# Patient Record
Sex: MALE | Race: WHITE
[De-identification: names, ages, dates, MRNs, and addresses within clinical notes are randomized per-mention and may not be internally consistent; named-entity substitution may affect disease eponyms.]

---

## 2018-04-12 ENCOUNTER — HOSPITAL ENCOUNTER (INPATIENT)
Dept: HOSPITAL 75 - ER | Age: 33
LOS: 2 days | Discharge: HOME | DRG: 872 | End: 2018-04-14
Attending: FAMILY MEDICINE | Admitting: FAMILY MEDICINE
Payer: COMMERCIAL

## 2018-04-12 VITALS — DIASTOLIC BLOOD PRESSURE: 65 MMHG | SYSTOLIC BLOOD PRESSURE: 130 MMHG

## 2018-04-12 VITALS — HEIGHT: 74 IN | BODY MASS INDEX: 36.46 KG/M2 | WEIGHT: 284.13 LBS

## 2018-04-12 DIAGNOSIS — A40.1: Primary | ICD-10-CM

## 2018-04-12 DIAGNOSIS — N39.0: ICD-10-CM

## 2018-04-12 DIAGNOSIS — Z79.899: ICD-10-CM

## 2018-04-12 DIAGNOSIS — E11.9: ICD-10-CM

## 2018-04-12 DIAGNOSIS — I10: ICD-10-CM

## 2018-04-12 LAB
ALBUMIN SERPL-MCNC: 4.3 GM/DL (ref 3.2–4.5)
ALP SERPL-CCNC: 42 U/L (ref 40–136)
ALT SERPL-CCNC: 48 U/L (ref 0–55)
AMYLASE SERPL-CCNC: 51 U/L (ref 25–125)
APTT PPP: YELLOW S
BACTERIA #/AREA URNS HPF: (no result) /HPF
BARBITURATES UR QL: NEGATIVE
BASOPHILS # BLD AUTO: 0 10^3/UL (ref 0–0.1)
BASOPHILS NFR BLD AUTO: 0 % (ref 0–10)
BASOPHILS NFR BLD MANUAL: 0 %
BENZODIAZ UR QL SCN: NEGATIVE
BILIRUB SERPL-MCNC: 1.2 MG/DL (ref 0.1–1)
BILIRUB UR QL STRIP: NEGATIVE
BUN/CREAT SERPL: 16
CALCIUM SERPL-MCNC: 9.5 MG/DL (ref 8.5–10.1)
CHLORIDE SERPL-SCNC: 101 MMOL/L (ref 98–107)
CO2 SERPL-SCNC: 25 MMOL/L (ref 21–32)
COCAINE UR QL: NEGATIVE
CREAT SERPL-MCNC: 0.9 MG/DL (ref 0.6–1.3)
EOSINOPHIL # BLD AUTO: 0.1 10^3/UL (ref 0–0.3)
EOSINOPHIL NFR BLD AUTO: 1 % (ref 0–10)
EOSINOPHIL NFR BLD MANUAL: 3 %
ERYTHROCYTE [DISTWIDTH] IN BLOOD BY AUTOMATED COUNT: 13.3 % (ref 10–14.5)
FIBRINOGEN PPP-MCNC: (no result) MG/DL
GFR SERPLBLD BASED ON 1.73 SQ M-ARVRAT: > 60 ML/MIN
GLUCOSE SERPL-MCNC: 143 MG/DL (ref 70–105)
GLUCOSE UR STRIP-MCNC: NEGATIVE MG/DL
HCT VFR BLD CALC: 43 % (ref 40–54)
HGB BLD-MCNC: 14.7 G/DL (ref 13.3–17.7)
KETONES UR QL STRIP: NEGATIVE
LEUKOCYTE ESTERASE UR QL STRIP: (no result)
LIPASE SERPL-CCNC: 6 U/L (ref 8–78)
LYMPHOCYTES # BLD AUTO: 0.9 X 10^3 (ref 1–4)
LYMPHOCYTES NFR BLD AUTO: 8 % (ref 12–44)
MANUAL DIFFERENTIAL PERFORMED BLD QL: YES
MCH RBC QN AUTO: 28 PG (ref 25–34)
MCHC RBC AUTO-ENTMCNC: 34 G/DL (ref 32–36)
MCV RBC AUTO: 82 FL (ref 80–99)
METHADONE UR QL SCN: NEGATIVE
METHAMPHETAMINE SCREEN URINE S: NEGATIVE
MONOCYTES # BLD AUTO: 0.1 X 10^3 (ref 0–1)
MONOCYTES NFR BLD AUTO: 1 % (ref 0–12)
MONOCYTES NFR BLD: 0 %
NEUTROPHILS # BLD AUTO: 9.4 X 10^3 (ref 1.8–7.8)
NEUTROPHILS NFR BLD AUTO: 90 % (ref 42–75)
NEUTS BAND NFR BLD MANUAL: 76 %
NEUTS BAND NFR BLD: 8 %
NITRITE UR QL STRIP: NEGATIVE
OPIATES UR QL SCN: NEGATIVE
OXYCODONE UR QL: NEGATIVE
PH UR STRIP: 6 [PH] (ref 5–9)
PLATELET # BLD: 180 10^3/UL (ref 130–400)
PMV BLD AUTO: 11.1 FL (ref 7.4–10.4)
POTASSIUM SERPL-SCNC: 3.8 MMOL/L (ref 3.6–5)
PROPOXYPH UR QL: NEGATIVE
PROT SERPL-MCNC: 7.1 GM/DL (ref 6.4–8.2)
PROT UR QL STRIP: (no result)
RBC # BLD AUTO: 5.19 10^6/UL (ref 4.35–5.85)
RBC #/AREA URNS HPF: (no result) /HPF
RBC MORPH BLD: NORMAL
SODIUM SERPL-SCNC: 137 MMOL/L (ref 135–145)
SP GR UR STRIP: 1.01 (ref 1.02–1.02)
TRICYCLICS UR QL SCN: NEGATIVE
UROBILINOGEN UR-MCNC: NORMAL MG/DL
VARIANT LYMPHS NFR BLD MANUAL: 13 %
WBC # BLD AUTO: 10.5 10^3/UL (ref 4.3–11)
WBC #/AREA URNS HPF: (no result) /HPF

## 2018-04-12 PROCEDURE — 80320 DRUG SCREEN QUANTALCOHOLS: CPT

## 2018-04-12 PROCEDURE — 87040 BLOOD CULTURE FOR BACTERIA: CPT

## 2018-04-12 PROCEDURE — 83690 ASSAY OF LIPASE: CPT

## 2018-04-12 PROCEDURE — 83605 ASSAY OF LACTIC ACID: CPT

## 2018-04-12 PROCEDURE — 85027 COMPLETE CBC AUTOMATED: CPT

## 2018-04-12 PROCEDURE — 74022 RADEX COMPL AQT ABD SERIES: CPT

## 2018-04-12 PROCEDURE — 80306 DRUG TEST PRSMV INSTRMNT: CPT

## 2018-04-12 PROCEDURE — 86308 HETEROPHILE ANTIBODY SCREEN: CPT

## 2018-04-12 PROCEDURE — 82962 GLUCOSE BLOOD TEST: CPT

## 2018-04-12 PROCEDURE — 87088 URINE BACTERIA CULTURE: CPT

## 2018-04-12 PROCEDURE — 36415 COLL VENOUS BLD VENIPUNCTURE: CPT

## 2018-04-12 PROCEDURE — 82150 ASSAY OF AMYLASE: CPT

## 2018-04-12 PROCEDURE — 96365 THER/PROPH/DIAG IV INF INIT: CPT

## 2018-04-12 PROCEDURE — 81000 URINALYSIS NONAUTO W/SCOPE: CPT

## 2018-04-12 PROCEDURE — 96361 HYDRATE IV INFUSION ADD-ON: CPT

## 2018-04-12 PROCEDURE — 85007 BL SMEAR W/DIFF WBC COUNT: CPT

## 2018-04-12 PROCEDURE — 74176 CT ABD & PELVIS W/O CONTRAST: CPT

## 2018-04-12 PROCEDURE — 96375 TX/PRO/DX INJ NEW DRUG ADDON: CPT

## 2018-04-12 PROCEDURE — 85025 COMPLETE CBC W/AUTO DIFF WBC: CPT

## 2018-04-12 PROCEDURE — 80053 COMPREHEN METABOLIC PANEL: CPT

## 2018-04-12 RX ADMIN — SODIUM CHLORIDE SCH MLS/HR: 900 INJECTION, SOLUTION INTRAVENOUS at 23:34

## 2018-04-12 NOTE — XMS REPORT
[facility]

 Created on: 10/06/2015



Wilmer Smart

External Reference #: [id number]

: 1985

Sex: Male



Demographics







 Address  [address] [place]  [address]

 

 Home Phone  [phone]

 

 Preferred Language  Unknown

 

 Marital Status  Unknown

 

 Anabaptism Affiliation  Unknown

 

 Race  White

 

 Ethnic Group  Not  or 





Author







 Author  STU ABRAHAM

 

 Organization  [employer]

 

 Address  Unknown

 

 Phone  Unavailable







Care Team Providers







 Care Team Member Name  Role  Phone

 

 STU ABRAHAM  CP  Unavailable



                                                                



Allergies

          No Known Allergies                                                   
                                     



Problems

          





 Problem Type  Condition  Code  Onset Dates  Condition Status

 

 Problem  Diabetes mellitus without mention of complication, type II or 
unspecified type, uncontrolled  250.02     Active

 

 Problem  Diabetes mellitus without mention of complication, type II or 
unspecified type, not stated as uncontrolled  250.00     Active

 

 Problem  Essential hypertension, benign  401.1     Active

 

 Problem  Generalized anxiety disorder  300.02     Active



                                                                               
                                       



Medications

          No Known Medications                                                 
                             



Results

          No Known Results                                                     
               



Summary Purpose

          eClinicalWorks Submission

## 2018-04-12 NOTE — XMS REPORT
Continuity of Care Document

 Created on: 2018



GERBER VIERA

External Reference #: [id number]

: 1985

Sex: Male



Demographics







 Address  [address] [place]  [address]

 

 Home Phone  [phone] x

 

 Preferred Language  Unknown

 

 Marital Status  Unknown

 

 Latter-day Affiliation  Unknown

 

 Race  Unknown

 

 Ethnic Group  Unknown





Author







 Author  [facility]

 

 Address  Unknown

 

 Phone  Unavailable



              



Allergies

      





 Active            Description            Code            Type            
Severity            Reaction            Onset            Reported/Identified   
         Relationship to Patient            Clinical Status        

 

 Yes            No Allergy Information Available            [id number]         
   Drug Allergy            Unknown            N/A                         10/14/
2015                                  



                  



Medications

      



There is no data.                  



Problems

      





 Date Dx Coded            Attending            Type            Code            
Diagnosis            Diagnosed By        

 

 2014            STU WELCH                         250.02   
         DIABETES II UNCONTROLLED (UNCOMPLICATED)                     

 

 2014            STU WELCH                         380.4    
        CERUMEN IMPACTION                     

 

 2014            STU WELCH                         401.1    
        HYPERTENSION, BENIGN ESSENTIAL                     

 

 2014            STU WELCH                         788.41   
         URINARY FREQUENCY                     

 

 2014            STU WELCH                         250.02   
         DIABETES II UNCONTROLLED (UNCOMPLICATED)                     

 

 2014            STU WELCH                         380.4    
        CERUMEN IMPACTION                     

 

 2014            STU WELCH                         401.1    
        HYPERTENSION, BENIGN ESSENTIAL                     

 

 2014            STU WELCH                         788.41   
         URINARY FREQUENCY                     

 

 2014            STU WELCH                         250.02   
         DIABETES II UNCONTROLLED (UNCOMPLICATED)                     

 

 2014            STU WELCH                         380.4    
        CERUMEN IMPACTION                     

 

 2014            STU WELCH                         401.1    
        HYPERTENSION, BENIGN ESSENTIAL                     

 

 2014            STU WELCH                         788.41   
         URINARY FREQUENCY                     

 

 2014            TESFAYE DO PASCUAL K                         250.02         
   DIABETES II UNCONTROLLED (UNCOMPLICATED)                     

 

 2014            TESFAYE DO PASCUAL K                         380.4          
  CERUMEN IMPACTION                     

 

 2014            TESFAYE DO PASCUAL K                         401.1          
  HYPERTENSION, BENIGN ESSENTIAL                     

 

 2014            TESFAYE DO, PASCUAL K                         788.41         
   URINARY FREQUENCY                     

 

 2014            STU WELCH                         V04.81   
         FLU SHOT                     

 

 2014            ABRAHAM APRN, STU R                         V04.81   
         FLU SHOT                     

 

 2014            JARAD LINDSEYSTU POLANCO                         V04.81   
         FLU SHOT                     

 

 2014            PASCUAL TESFAYE DO                         V04.81         
   FLU SHOT                     

 

 2014            JARAD LINDSEYSTU POLANCO                         300.02   
         AN GEN ANXIETY                     

 

 2014            JARAD LINDSEYSTU POLANCO                         300.02   
         AN GEN ANXIETY                     

 

 2014            PASCUAL TESFAYE DO                         300.02         
   AN GEN ANXIETY                     

 

 2015            JARAD LINDSEYSTU POLANCO                         250.00   
         DIABETES II CONTROLLED (UNCOMPLICATED)                     

 

 2015            MERA  PASCUAL GOMEZ                         250.00         
   DIABETES II CONTROLLED (UNCOMPLICATED)                     

 

 10/28/2015            TAWIL MD, TETO EUBANKS            Ot            N35.9        
                          

 

 2016                         Ot            R10.11                       
           

 

 2016                         Ot            R10.11                       
           

 

 2016                         Ot            R10.11            RIGHT UPPER 
QUADRANT PAIN                     

 

 2018            TAWIL MD, ELIAS A            Ot            N35.9        
    URETHRAL STRICTURE, UNSPECIFIED                     

 

 2018                         Ot            R10.11            RIGHT UPPER 
QUADRANT PAIN                     

 

 2018            TAWIL MD, ELIAS A            Ot            N35.9        
    URETHRAL STRICTURE, UNSPECIFIED                     

 

 2018                         Ot            R10.11            RIGHT UPPER 
QUADRANT PAIN                     

 

 2018            TAWIL MD, ELIAS A            Ot            N35.9        
    URETHRAL STRICTURE, UNSPECIFIED                     

 

 2018                         Ot            R10.11            RIGHT UPPER 
QUADRANT PAIN                     



                                                                               
       



Procedures

      





 Code            Description            Performed By            Performed On   
     

 

             06502                                  UA LONG DIP                
                   2014        

 

             63331                                  GLUCOSE FINGER STICK       
                            2014        

 

             80678                                  A1C (IN-HOUSE)             
                      2014        

 

             80140                                  MICRO ALBUMIN-IN HOUSE     
                              2014        

 

             49335                                  ROUTINE VENIPUNCTURE       
                            2014        

 

             16668                                  CBC                        
           2014        

 

             8965389                                  GFR CALC (RESULT ONLY)   
                                2014        

 

             70820                                  CMP                        
           2014        

 

             08972                                  LIPID PANEL                
                   2014        

 

             90829                                  PSA TOTAL                  
                 2014        

 

             65293                                  TSH                        
           2014        

 

             14832                                  A1C (IN-HOUSE)             
                      2015        



                                        



Results

      





 Test            Result            Range        









 HEPATITIS PROFILE - 10/23/17 08:22         









 Hep A Ab, IgM            Negative             Negative        

 

 HBsAg Screen            Negative             Negative        

 

 Hep B Core Ab, IgM            Negative             Negative        

 

 Hep C Virus Ab            <0.1 s/co ratio            0.0-0.9        









 CBC With Differential/Platelet - 10/23/17 08:22         









 WBC            4.8 x10E3/uL            3.4-10.8        

 

 RBC            6.04 x10E6/uL            4.14-5.80        

 

 Hemoglobin            16.3 g/dL            12.6-17.7        

 

 Hematocrit            49.3 %            37.5-51.0        

 

 MCV            82 fL            79-97        

 

 MCH            27.0 pg            26.6-33.0        

 

 MCHC            33.1 g/dL            31.5-35.7        

 

 RDW            13.0 %            12.3-15.4        

 

 Platelets            225 x10E3/uL            150-379        

 

 Neutrophils            51 %            Not Estab.        

 

 Lymphs            33 %            Not Estab.        

 

 Monocytes            13 %            Not Estab.        

 

 Eos            2 %            Not Estab.        

 

 Basos            1 %            Not Estab.        

 

 Neutrophils (Absolute)            2.4 x10E3/uL            1.4-7.0        

 

 Lymphs (Absolute)            1.6 x10E3/uL            0.7-3.1        

 

 Monocytes(Absolute)            0.6 x10E3/uL            0.1-0.9        

 

 Eos (Absolute)            0.1 x10E3/uL            0.0-0.4        

 

 Baso (Absolute)            0.1 x10E3/uL            0.0-0.2        

 

 Immature Granulocytes            0 %            Not Estab.        

 

 Immature Grans (Abs)            0.0 x10E3/uL            0.0-0.1        









 Comp. Metabolic Panel (14) - 10/23/17 08:22         









 Glucose, Serum            292 mg/dL            65-99        

 

 BUN            15 mg/dL            6-20        

 

 Creatinine, Serum            0.78 mg/dL            0.76-1.27        

 

 eGFR If NonAfricn Am            119 mL/min/1.73                >59        

 

 eGFR If Africn Am            137 mL/min/1.73                >59        

 

 BUN/Creatinine Ratio            19             9-20        

 

 Sodium, Serum            138 mmol/L            134-144        

 

 Potassium, Serum            4.7 mmol/L            3.5-5.2        

 

 Chloride, Serum            96 mmol/L                    

 

 Carbon Dioxide, Total            25 mmol/L            18-29        

 

 Calcium, Serum            9.7 mg/dL            8.7-10.2        

 

 Protein, Total, Serum            7.6 g/dL            6.0-8.5        

 

 Albumin, Serum            4.7 g/dL            3.5-5.5        

 

 Globulin, Total            2.9 g/dL            1.5-4.5        

 

 A/G Ratio            1.6             1.2-2.2        

 

 Bilirubin, Total            1.2 mg/dL            0.0-1.2        

 

 Alkaline Phosphatase, S            53 IU/L                    

 

 AST (SGOT)            27 IU/L            0-40        

 

 ALT (SGPT)            78 IU/L            0-44        









 Lipid Panel - 10/23/17 08:22         









 Cholesterol, Total            196 mg/dL            100-199        

 

 Triglycerides            166 mg/dL            0-149        

 

 HDL Cholesterol            37 mg/dL            >39        

 

 VLDL Cholesterol Graham            33 mg/dL            5-40        

 

 LDL Cholesterol Calc            126 mg/dL            0-99        









 Hepatitis Panel (4) - 10/23/17 08:22         









 HBsAg Screen            Negative             Negative        

 

 Hep A Ab, IgM            Negative             Negative        

 

 Hep B Core Ab, IgM            Negative             Negative        

 

 Hep C Virus Ab            <0.1 s/co ratio            0.0-0.9        









 Thyroid Hunters Profile - 10/23/17 08:22         









 TSH            1.590 uIU/mL            0.450-4.500        



                          



Encounters

      





 ACCT No.            Visit Date/Time            Discharge            Status    
        Pt. Type            Provider            Facility            Loc./Unit  
          Complaint        

 

 [id number]            2015 13:50:00            2015 23:59:59          
  CLS            Outpatient            PASCUAL TESFAYE DO                        
                       

 

 [id number]            2015 13:23:00            2015 23:59:59          
  CLS            Outpatient            STU WELCH                  
                             

 

 [id number]            2014 13:09:00            2014 23:59:59          
  CLS            Outpatient            STU WELCH                  
                             

 

 [id number]            2014 10:12:00            2014 23:59:59          
  CLS            Outpatient            STU WELCH                  
                             

 

 [id number]            2018 19:00:00                         ACT            
Outpatient            [facility]ATHIS                         [facility]                     

 

 [id number]            10/23/2017 08:20:00                                      
Document Registration                                                          
  

 

 [id number]            10/14/2015 12:02:00            10/14/2015 23:59:59    
        CLS            Outpatient            TAWIL MD, ELIAS A            [facility]            RAD            STRICTURE        

 

 [id number]            2018 22:43:00                         ACT       
     Inpatient            CJ GLASGOW MD            [facility]            4TH            SEPSIS;UTI;NIDDM        

 

 [id number]            2016 10:04:00                                   
   Document Registration                                                       
     

 

 [id number]            10/24/2017 12:08:00                                   
   Document Registration

## 2018-04-12 NOTE — DIAGNOSTIC IMAGING REPORT
PROCEDURE: CT urinary tract, rule out kidney stone.



TECHNIQUE: Multiple contiguous axial images were obtained through

the abdomen and pelvis without the use of intravenous contrast.



INDICATION: Back pain, dysuria.



COMPARISON: There are no prior studies available for comparison.



FINDINGS:  

There is no evidence for nephrolithiasis or urolithiasis and the

kidneys do not seem to be obstructed.



The urinary bladder is not well-distended and there does seem to

be generalized thickening of the wall of the bladder. This is

probably related to the incomplete distention of the bladder.

However, the possibility that there is an element of cystitis

present should still be considered. Correlation with the

patient's urinalysis would be recommended. 



The prostate gland is not enlarged. 



There is no pelvic mass or free fluid collection noted.



The appendix was visualized and is not abnormally thickened.



Both the liver and spleen are mildly enlarged. The liver measures

approximately 18.5 cm in length while the spleen is estimated at

13.6 cm. The reason for the hepatosplenomegaly is not certain.



The stomach is partially filled with fluid and gas and difficult

to assess.



The gallbladder, adrenals, pancreas, aorta and inferior vena cava

show no sign of an acute abnormality. 



The lung bases are clear.



The bone windows show no sign of a fracture or of a destructive

lesion. 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

however.



IMPRESSION:

1. There is no acute abnormality of the abdomen or pelvis.

2. The thickened appearance of the wall of the bladder may be

secondary to incomplete distention. The possibility that there is

an element of cystitis present should also be considered.

Correlation with patient's urinalysis would be recommended. 

3. There is mild hepatosplenomegaly. This is of uncertain

etiology.









Dictated by: 



  Dictated on workstation # [id number]

## 2018-04-12 NOTE — XMS REPORT
[facility]

 Created on: 2015



Wilmer Smart

External Reference #: [id number]

: 1985

Sex: Male



Demographics







 Address  [address] [place]  [address]

 

 Home Phone  [phone]

 

 Preferred Language  Unknown

 

 Marital Status  Unknown

 

 Synagogue Affiliation  Unknown

 

 Race  White

 

 Ethnic Group  Not  or 





Author







 Author  STU ABRAHAM

 

 Organization  [employer]

 

 Address  Unknown

 

 Phone  Unavailable







Care Team Providers







 Care Team Member Name  Role  Phone

 

 STU ABRAHAM  CP  Unavailable



                                                                



Allergies

          No Known Allergies                                                   
                                     



Problems

          





 Problem Type  Condition  ICD-9 Code  Onset Dates  Condition Status

 

 Problem  Diabetes mellitus without mention of complication, type II or 
unspecified type, uncontrolled  250.02     Active

 

 Problem  Diabetes mellitus without mention of complication, type II or 
unspecified type, not stated as uncontrolled  250.00     Active

 

 Problem  Essential hypertension, benign  401.1     Active

 

 Problem  Generalized anxiety disorder  300.02     Active



                                                                               
                                       



Medications

          





 Medication  Code System  Code  Instructions  Start Date  End Date  Status  
Dosage

 

 Celexa  NDC  [address]-99  20 MG Orally Once a day  2015        1 
tablet

 

 Terazosin HCl  NDC  [address]-85  2 MG Orally Once a day  Aug 27, 2015        
1 capsule



                                                                               
         



Results

          No Known Results                                                     
               



Summary Purpose

          eClinicalWorks Submission

## 2018-04-12 NOTE — DIAGNOSTIC IMAGING REPORT
EXAMINATION: Acute abdomen



INDICATION: Back pain and dysuria



The accompanying erect PA chest shows the heart size to be within

normal limits. The perihilar markings are prominent bilaterally,

particularly on the right. There is no evidence for a hilar mass

on the right but a followup PA and lateral chest would be

recommended for further study unless there are previous exams

available to demonstrate that this finding is stable.



The lungs are clear. There is no sign of pneumoperitoneum.



Supine and erect views of the abdomen were obtained. There is gas

in both the large and small bowel in a nonspecific fashion. There

is no evidence for bowel obstruction. Both the liver and spleen

shadows do seem prominent. The CT abdomen/pelvis exam performed

prior to the study did suggest mild hepatosplenomegaly. The

osseous structures are intact.



IMPRESSION:

1. The bowel gas pattern is nonspecific. There is no acute

abnormality identified.

2. There is mild hepatosplenomegaly.

3. The right hilum is prominent. This finding is more likely due

to superimposition of the pulmonary vessels than to a mass

lesion. Recommendations as above.



Dictated by: 



  Dictated on workstation # [id number]

## 2018-04-12 NOTE — XMS REPORT
[facility]

 Created on: 2017



Bing Wilmer

External Reference #: [id number]

: 1985

Sex: Male



Demographics







 Address  [address] [place]  [address]

 

 Preferred Language  Unknown

 

 Marital Status  Unknown

 

 Latter day Affiliation  Unknown

 

 Race  Unknown

 

 Ethnic Group  Unknown





Author







 Author  STU ABRAHAM

 

 [facility]

 

 Address  [address] [place]  [address]



 

 Phone  [phone]







Care Team Providers







 Care Team Member Name  Role  Phone

 

 STU ABRAHAM  Unavailable  [phone]







PROBLEMS







 Type  Condition  ICD9-CM Code  DDY20-PW Code  Onset Dates  Condition Status  
SNOMED Code

 

 Problem  Essential hypertension, benign  401.1        Active  0210413

 

 Problem  Diabetes mellitus without mention of complication, type II or 
unspecified type, uncontrolled  250.02        Active  860566219

 

 Problem  Diabetes mellitus without mention of complication, type II or 
unspecified type, not stated as uncontrolled  250.00        Active  830865869

 

 Problem  Generalized anxiety disorder  300.02        Active  91963990







ALLERGIES

Unknown Allergies



SOCIAL HISTORY

No smoking Hx information available



PLAN OF CARE





VITAL SIGNS





MEDICATIONS

Unknown Medications



RESULTS

No Results



PROCEDURES

No Known procedures



IMMUNIZATIONS

No Known Immunizations

## 2018-04-12 NOTE — XMS REPORT
[facility]

 Created on: 2015



Lonoke Wilmer

External Reference #: [id number]

: 1985

Sex: Male



Demographics







 Address  [address] [place]  [address]

 

 Home Phone  [phone]

 

 Preferred Language  Unknown

 

 Marital Status  Unknown

 

 Yarsanism Affiliation  Unknown

 

 Race  White

 

 Ethnic Group  Not  or 





Author







 Author  STU ABRAHAM

 

 Organization  [employer]

 

 Address  Unknown

 

 Phone  Unavailable







Care Team Providers







 Care Team Member Name  Role  Phone

 

 STU ABRAHAM  CP  Unavailable



                                                                



Allergies

          No Known Allergies                                                   
                                     



Problems

          





 Problem Type  Condition  ICD-9 Code  Onset Dates  Condition Status

 

 Problem  Diabetes mellitus without mention of complication, type II or 
unspecified type, uncontrolled  250.02     Active

 

 Problem  Diabetes mellitus without mention of complication, type II or 
unspecified type, not stated as uncontrolled  250.00     Active

 

 Problem  Essential hypertension, benign  401.1     Active

 

 Problem  Generalized anxiety disorder  300.02     Active

 

 Assessment  Essential hypertension, benign  401.1     Active



                                                                               
                                                 



Medications

          





 Medication  Code System  Code  Instructions  Start Date  End Date  Status  
Dosage

 

 Lisinopril  NDC  [address]-49  10 MG Orally Once a day  2015        
take 1 tablet



                                                                              



Results

          No Known Results                                                     
               



Summary Purpose

          eClinicalWorks Submission

## 2018-04-12 NOTE — XMS REPORT
[facility]

 Created on: 2016



Mad[place] Wilmer

External Reference #: [id number]

: 1985

Sex: Male



Demographics







 Address  [address] [place]  [address]

 

 Home Phone  [phone]

 

 Preferred Language  Unknown

 

 Marital Status  Unknown

 

 Temple Affiliation  Unknown

 

 Race  White

 

 Ethnic Group  Not  or 





Author







 Author  STU ABRAHAM

 

 Organization  [employer]

 

 Address  Unknown

 

 Phone  Unavailable







Care Team Providers







 Care Team Member Name  Role  Phone

 

 STU ABRAHAM  CP  Unavailable



                                                                



Allergies

          No Known Allergies                                                   
                                     



Problems

          





 Problem Type  Condition  Code  Onset Dates  Condition Status

 

 Problem  Diabetes mellitus without mention of complication, type II or 
unspecified type, uncontrolled  250.02     Active

 

 Problem  Diabetes mellitus without mention of complication, type II or 
unspecified type, not stated as uncontrolled  250.00     Active

 

 Problem  Essential hypertension, benign  401.1     Active

 

 Problem  Generalized anxiety disorder  300.02     Active



                                                                               
                                       



Medications

          





 Medication  Code System  Code  Instructions  Start Date  End Date  Status  
Dosage

 

 Celexa  NDC  [address]-69  20 MG Orally Once a day  2015        1 
tablet

 

 Simvastatin  NDC  [address]-10  20 MG Orally Once a day  2014        
1 tablet in the evening

 

 Terazosin HCl  NDC  [address]-33  2 MG Orally Once a day  Aug 27, 2015        
1 capsule



                                                                               
                   



Results

          No Known Results                                                     
               



Summary Purpose

          [employer] Submission

## 2018-04-12 NOTE — XMS REPORT
[facility]

 Created on: 2015



[place] Wilmer

External Reference #: [id number]

: 1985

Sex: Male



Demographics







 Address  [address] [place]  [address]

 

 Home Phone  [phone]

 

 Preferred Language  Unknown

 

 Marital Status  Unknown

 

 [employer] Affiliation  Unknown

 

 Race  White

 

 Ethnic Group  Not  or 





Author







 Author  STU ABRAHAM

 

 Organization  [employer]

 

 Address  Unknown

 

 Phone  Unavailable







Care Team Providers







 Care Team Member Name  Role  Phone

 

 STU ABRAHAM  CP  Unavailable



                                                                



Allergies

          No Known Allergies                                                   
                                     



Problems

          





 Problem Type  Condition  Code  Onset Dates  Condition Status

 

 Problem  Diabetes mellitus without mention of complication, type II or 
unspecified type, uncontrolled  250.02     Active

 

 Problem  Diabetes mellitus without mention of complication, type II or 
unspecified type, not stated as uncontrolled  250.00     Active

 

 Problem  Essential hypertension, benign  401.1     Active

 

 Problem  Generalized anxiety disorder  300.02     Active



                                                                               
                                       



Medications

          





 Medication  Code System  Code  Instructions  Start Date  End Date  Status  
Dosage

 

 Lisinopril  NDC  [address]-71  10 MG Orally Once a day  2015        
take 1 tablet



                                                                              



Results

          No Known Results                                                     
               



Summary Purpose

          [employer] Submission

## 2018-04-12 NOTE — ED ABDOMINAL PAIN
General


Chief Complaint:  Abdominal/GI Problems


Stated Complaint:  LUQ PAIN


Source of Information:  Patient, EMS


Exam Limitations:  Other (SOMEWHAT DIFFICULT HISTORIAN)





History of Present Illness


Date Seen by Provider:  Apr 12, 2018


Time Seen by Provider:  20:23


Initial Comments


PT ARRIVES VIA EMS FROM [facility]


PT HAS HAD ONGOING PROBLEMS WITH URINATION--URGENCY, FREQUENCY, SMALL AMOUNTS, 

PAIN ON URINATION, AND BLADDER SPASMS--ONGOING OFF AND ON FOR YEARS, WORSE THE 

LAST MONTH OR SO


C/O LUQ AND LEFT FLANK PAIN X 1 MONTH, WORSE TODAY 


WOKE UP AT 1700 TONIGHT WITH FEVER, AND NAUSEA/DRY HEAVES--STATES HE "FELT FINE

" EARLIER IN THE DAY


TEMP .3 AT [facility], SO EMS WAS CALLED. PO ZOFRAN WAS GIVEN AT [facility]


ALSO C/O HEADACHE


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 

IT,BUT PT STATES "IT DIDN'T WORK" 


PT HAS NOT TAKEN ANYTHING FOR HIS SYMPTOMS


PT HAS NOT SOUGHT CARE UNTIL TODAY FOR THESE PROBLEMS


PCP: [facility]





Allergies and Home Medications


Allergies


Coded Allergies:  


     No Known Drug Allergies (Unverified , 4/12/18)





Home Medications


Lisinopril 10 Mg Tablet, 10 MG PO DAILY, (Reported)





Patient Home Medication List


Home Medication List Reviewed:  Yes





Review of Systems


Constitutional:  see HPI, fever


EENTM:  No Symptoms Reported


Respiratory:  No Symptoms Reported


Cardiovascular:  No Symptoms Reported


Gastrointestinal:  See HPI, Abdominal Pain, Nausea, Vomiting


Genitourinary:  See HPI, Burning, Frequency, Flank Pain, Pain, Urgency


Musculoskeletal:  see HPI, back pain


Skin:  no symptoms reported


Psychiatric/Neurological:  Headache


Endocrine:  No Symptoms Reported


Hematologic/Lymphatic:  No Symptoms Reported





Past Medical-Social-Family Hx


Patient Social History


Alcohol Use:  Regular Use ("COUPLE OF BEERS A DAY" )


Recreational Drug Use:  Yes (THC)


Drug of Choice:  THC


Smoking Status:  Former Smoker (SMOKED WHEN YOUNGER)


Type Used:  Cigarettes


Recent Foreign Travel:  No


Contact w/Someone Who Travel:  No





Past Medical History


Surgeries:  Yes (URINARY TRACT STRICTURE, LEFT ANKLE FX/ORIF)


Orthopedic


Respiratory:  No


Cardiac:  Yes


High Cholesterol, Hypertension


Neurological:  No


Genitourinary:  Yes (URINARY TRACT STRICTURE)


UTI-Chronic


Gastrointestinal:  No


Musculoskeletal:  No


Endocrine:  Yes


Diabetes, Non-Insulin dep


HEENT:  No


Cancer:  No


Psychosocial:  Yes


Anxiety


Integumentary:  No


Blood Disorders:  No





Physical Exam


Vital Signs





Vital Signs - First Documented








 4/12/18





 20:29


 


Temp 103.2


 


Pulse 125


 


Resp 24


 


B/P (MAP) 138/71 (93)


 


Pulse Ox 96





Capillary Refill :


General Appearance:  obese, other (SOMEWHAT DRAMATIC)


HEENT:  PERRL/EOMI


Neck:  normal inspection


Respiratory:  normal breath sounds, no respiratory distress


Cardiovascular:  normal peripheral pulses, no edema, no JVD, no murmur, 

tachycardia


Gastrointestinal:  normal bowel sounds, soft, no organomegaly, no pulsatile mass

; No distended, No guarding, No rebound; tenderness (MILD DIFFUSE TENDERNESS, 

MORE ON LEFT SIDE AND SUPRAPUBIC AREA); No hernia, No mass





Focused Exam


Lactate Level


4/12/18 20:34: Lactic Acid Level 2.44*H





Lactic Acid Level





Laboratory Tests








Test


 4/12/18


20:34


 


Lactic Acid Level


 2.44 MMOL/L


(0.50-2.00)  *H











Progress/Results/Core Measures


Lab Results





Laboratory Tests








Test


 4/12/18


20:28 4/12/18


20:34 4/12/18


20:45 Range/Units


 


 


White Blood Count


 10.5 


 


 


 4.3-11.0


10^3/uL


 


Red Blood Count


 5.19 


 


 


 4.35-5.85


10^6/uL


 


Hemoglobin 14.7    13.3-17.7  G/DL


 


Hematocrit 43    40-54  %


 


Mean Corpuscular Volume 82    80-99  FL


 


Mean Corpuscular Hemoglobin 28    25-34  PG


 


Mean Corpuscular Hemoglobin


Concent 34 


 


 


 32-36  G/DL





 


Red Cell Distribution Width 13.3    10.0-14.5  %


 


Platelet Count


 180 


 


 


 130-400


10^3/uL


 


Mean Platelet Volume 11.1 H   7.4-10.4  FL


 


Neutrophils (%) (Auto) 90 H   42-75  %


 


Lymphocytes (%) (Auto) 8 L   12-44  %


 


Monocytes (%) (Auto) 1    0-12  %


 


Eosinophils (%) (Auto) 1    0-10  %


 


Basophils (%) (Auto) 0    0-10  %


 


Neutrophils # (Auto) 9.4 H   1.8-7.8  X 10^3


 


Lymphocytes # (Auto) 0.9 L   1.0-4.0  X 10^3


 


Monocytes # (Auto) 0.1    0.0-1.0  X 10^3


 


Eosinophils # (Auto)


 0.1 


 


 


 0.0-0.3


10^3/uL


 


Basophils # (Auto)


 0.0 


 


 


 0.0-0.1


10^3/uL


 


Neutrophils % (Manual) 76     %


 


Lymphocytes % (Manual) 13     %


 


Monocytes % (Manual) 0     %


 


Eosinophils % (Manual) 3     %


 


Basophils % (Manual) 0     %


 


Band Neutrophils 8     %


 


Blood Morphology Comment NORMAL     


 


Urine Color YELLOW     


 


Urine Clarity


 SLIGHTLY


CLOUDY 


 


  





 


Urine pH 6    5-9  


 


Urine Specific Gravity 1.010 L   1.016-1.022  


 


Urine Protein 2+ H   NEGATIVE  


 


Urine Glucose (UA) NEGATIVE    NEGATIVE  


 


Urine Ketones NEGATIVE    NEGATIVE  


 


Urine Nitrite NEGATIVE    NEGATIVE  


 


Urine Bilirubin NEGATIVE    NEGATIVE  


 


Urine Urobilinogen NORMAL    NORMAL  MG/DL


 


Urine Leukocyte Esterase 3+ H   NEGATIVE  


 


Urine RBC (Auto) 2+ H   NEGATIVE  


 


Urine RBC 0-2     /HPF


 


Urine WBC TNTC H    /HPF


 


Urine Crystals NONE     /LPF


 


Urine Bacteria LARGE H    /HPF


 


Urine Casts NONE     /LPF


 


Urine Mucus NEGATIVE     /LPF


 


Urine Culture Indicated YES     


 


Sodium Level 137    135-145  MMOL/L


 


Potassium Level 3.8    3.6-5.0  MMOL/L


 


Chloride Level 101      MMOL/L


 


Carbon Dioxide Level 25    21-32  MMOL/L


 


Anion Gap 11    5-14  MMOL/L


 


Blood Urea Nitrogen 14    7-18  MG/DL


 


Creatinine


 0.90 


 


 


 0.60-1.30


MG/DL


 


Estimat Glomerular Filtration


Rate > 60 


 


 


  





 


BUN/Creatinine Ratio 16     


 


Glucose Level 143 H     MG/DL


 


Calcium Level 9.5    8.5-10.1  MG/DL


 


Total Bilirubin 1.2 H   0.1-1.0  MG/DL


 


Aspartate Amino Transf


(AST/SGOT) 18 


 


 


 5-34  U/L





 


Alanine Aminotransferase


(ALT/SGPT) 48 


 


 


 0-55  U/L





 


Alkaline Phosphatase 42      U/L


 


Total Protein 7.1    6.4-8.2  GM/DL


 


Albumin 4.3    3.2-4.5  GM/DL


 


Amylase Level 51      U/L


 


Lipase 6 L   8-78  U/L


 


Urine Opiates Screen NEGATIVE    NEGATIVE  


 


Urine Oxycodone Screen NEGATIVE    NEGATIVE  


 


Urine Methadone Screen NEGATIVE    NEGATIVE  


 


Urine Propoxyphene Screen NEGATIVE    NEGATIVE  


 


Urine Barbiturates Screen NEGATIVE    NEGATIVE  


 


Ur Tricyclic Antidepressants


Screen NEGATIVE 


 


 


 NEGATIVE  





 


Urine Phencyclidine Screen NEGATIVE    NEGATIVE  


 


Urine Amphetamines Screen NEGATIVE    NEGATIVE  


 


Urine Methamphetamines Screen NEGATIVE    NEGATIVE  


 


Urine Benzodiazepines Screen NEGATIVE    NEGATIVE  


 


Urine Cocaine Screen NEGATIVE    NEGATIVE  


 


Urine Cannabinoids Screen POSITIVE H   NEGATIVE  


 


Serum Alcohol < 10    <10  MG/DL


 


Lactic Acid Level


 


 2.44 *H


 


 0.50-2.00


MMOL/L


 


Monoscreen   NEGATIVE  NEGATIVE  








My Orders





Orders - BRE SEALS DO


Saline Lock/Iv-Start (4/12/18 20:27)


Ct Abd/Pelvis Wo(Kidney Stone) (4/12/18 20:27)


Amylase (4/12/18 20:27)


Cbc With Automated Diff (4/12/18 20:27)


Comprehensive Metabolic Panel (4/12/18 20:27)


Drug Screen Stat (Urine) (4/12/18 20:27)


Lactic Acid Analyzer (4/12/18 20:27)


Lipase (4/12/18 20:27)


Ua Culture If Indicated (4/12/18 20:27)


Blood Culture (4/12/18 20:27)


Acute Abd Series (4/12/18 20:27)


Saline Lock/Iv-Start (4/12/18 20:27)


Lactated Ringers (Lr 1000 Ml Iv Solution (4/12/18 20:27)


Ondansetron Injection (Zofran Injectio (4/12/18 20:30)


Hyoscyamine Sl Tablet (Levsin Sl Tablet) (4/12/18 20:30)


Ketorolac Injection (Toradol Injection) (4/12/18 20:27)


Alfuzosin (Not Stocked) (Uroxatral (Not (4/12/18 20:30)


Alcohol (4/12/18 20:37)


Ns Iv 1000 Ml (Sodium Chloride 0.9%) (4/12/18 20:36)


Acetaminophen  Tablet (Tylenol  Tablet) (4/12/18 20:45)


Ibuprofen  Tablet (Motrin  Tablet) (4/12/18 20:45)


Manual Differential (4/12/18 20:28)


Urine Culture (4/12/18 20:28)


Ceftriaxone Injection (Rocephin Injectio (4/12/18 21:15)


Ibuprofen  Tablet (Motrin  Tablet) (4/12/18 21:09)


Ceftriaxone Injection (Rocephin Injectio (4/12/18 21:09)


Ns (Ivpb) (Sodium Chloride 0.9% Ivpb Bag (4/12/18 21:10)


Monotest (4/12/18 21:58)





Medications Given in ED





Current Medications








 Medications  Dose


 Ordered  Sig/Rena


 Route  Start Time


 Stop Time Status Last Admin


Dose Admin


 


 Acetaminophen  1,000 mg  ONCE  ONCE


 PO  4/12/18 20:45


 4/12/18 21:11 DC 4/12/18 21:15


1,000 MG


 


 Ceftriaxone


 Sodium 1000 mg/


 Sodium Chloride  100 ml @ 


 200 mls/hr  ONCE  ONCE


 IV  4/12/18 21:15


 4/12/18 21:44 DC 4/12/18 21:14


200 MLS/HR


 


 Hyoscyamine


 Sulfate  0.25 mg  ONCE  ONCE


 SL  4/12/18 20:30


 4/12/18 20:31 DC 4/12/18 20:41


0.25 MG


 


 Ibuprofen  800 mg  ONCE  ONCE


 PO  4/12/18 20:45


 4/12/18 21:11 DC 4/12/18 21:15


800 MG


 


 Ondansetron HCl  4 mg  ONCE  ONCE


 IVP  4/12/18 20:30


 4/12/18 20:31 DC 4/12/18 20:41


4 MG


 


 Sodium Chloride  1,000 ml @ 


 ud  STK-MED ONCE


 .ROUTE  4/12/18 20:36


 4/12/18 20:42 DC 4/12/18 20:42


0 MLS/HR








Vital Signs/I&O











 4/12/18





 20:29


 


Temp 103.2


 


Pulse 125


 


Resp 24


 


B/P (MAP) 138/71 (93)


 


Pulse Ox 96














 4/13/18





 00:00


 


Intake Total 1100 ml


 


Balance 1100 ml








Progress Note :  


Progress Note


SYMPTOMS IMPROVED AT TIME OF ADMIT





Comments


CT ABDOMEN/PELVIS--NO ACUTE PROCESS, UNDERDISTENTION OF BLADDER VS MILD CYSTITIS

, HEPATOSPLENOMEGALY, PER RADIOLOGIST REPORT @2159


   Reviewed:  Reviewed by Me





Departure


Communication (Admissions)


2200--SPOKE WITH DR. GLASGOW. ACCEPTS PT FOR ADMIT.





Impression





 Primary Impression:  


 Sepsis


 Additional Impressions:  


 UTI (urinary tract infection)


 NIDDM


 History of hypertension


 HISTORY OF HTN


Disposition:  09 ADMITTED AS INPATIENT


Condition:  Improved





Admissions


Decision to Admit Reason:  Admit from ER (General)


Decision to Admit/Date:  Apr 12, 2018


Time/Decision to Admit Time:  22:00





Departure-Patient Inst.


Referrals:  


[facility] (PCP/Family)


Primary Care Physician











BRE SEALS DO Apr 12, 2018 20:43

## 2018-04-13 VITALS — SYSTOLIC BLOOD PRESSURE: 143 MMHG | DIASTOLIC BLOOD PRESSURE: 57 MMHG

## 2018-04-13 VITALS — DIASTOLIC BLOOD PRESSURE: 58 MMHG | SYSTOLIC BLOOD PRESSURE: 108 MMHG

## 2018-04-13 VITALS — DIASTOLIC BLOOD PRESSURE: 58 MMHG | SYSTOLIC BLOOD PRESSURE: 127 MMHG

## 2018-04-13 VITALS — SYSTOLIC BLOOD PRESSURE: 132 MMHG | DIASTOLIC BLOOD PRESSURE: 69 MMHG

## 2018-04-13 VITALS — SYSTOLIC BLOOD PRESSURE: 110 MMHG | DIASTOLIC BLOOD PRESSURE: 56 MMHG

## 2018-04-13 LAB
ALBUMIN SERPL-MCNC: 3.6 GM/DL (ref 3.2–4.5)
ALP SERPL-CCNC: 29 U/L (ref 40–136)
ALT SERPL-CCNC: 40 U/L (ref 0–55)
BASOPHILS # BLD AUTO: 0 10^3/UL (ref 0–0.1)
BASOPHILS NFR BLD AUTO: 0 % (ref 0–10)
BASOPHILS NFR BLD MANUAL: 0 %
BILIRUB SERPL-MCNC: 1.4 MG/DL (ref 0.1–1)
BUN/CREAT SERPL: 19
CALCIUM SERPL-MCNC: 8.3 MG/DL (ref 8.5–10.1)
CHLORIDE SERPL-SCNC: 101 MMOL/L (ref 98–107)
CO2 SERPL-SCNC: 21 MMOL/L (ref 21–32)
CREAT SERPL-MCNC: 0.98 MG/DL (ref 0.6–1.3)
EOSINOPHIL # BLD AUTO: 0 10^3/UL (ref 0–0.3)
EOSINOPHIL NFR BLD AUTO: 0 % (ref 0–10)
EOSINOPHIL NFR BLD MANUAL: 0 %
ERYTHROCYTE [DISTWIDTH] IN BLOOD BY AUTOMATED COUNT: 13.1 % (ref 10–14.5)
GFR SERPLBLD BASED ON 1.73 SQ M-ARVRAT: > 60 ML/MIN
GLUCOSE SERPL-MCNC: 235 MG/DL (ref 70–105)
HCT VFR BLD CALC: 37 % (ref 40–54)
HGB BLD-MCNC: 12.7 G/DL (ref 13.3–17.7)
LYMPHOCYTES # BLD AUTO: 0.5 X 10^3 (ref 1–4)
LYMPHOCYTES NFR BLD AUTO: 2 % (ref 12–44)
MANUAL DIFFERENTIAL PERFORMED BLD QL: YES
MCH RBC QN AUTO: 29 PG (ref 25–34)
MCHC RBC AUTO-ENTMCNC: 34 G/DL (ref 32–36)
MCV RBC AUTO: 83 FL (ref 80–99)
MONOCYTES # BLD AUTO: 0.8 X 10^3 (ref 0–1)
MONOCYTES NFR BLD AUTO: 4 % (ref 0–12)
MONOCYTES NFR BLD: 2 %
NEUTROPHILS # BLD AUTO: 17.8 X 10^3 (ref 1.8–7.8)
NEUTROPHILS NFR BLD AUTO: 93 % (ref 42–75)
NEUTS BAND NFR BLD MANUAL: 76 %
NEUTS BAND NFR BLD: 16 %
PLATELET # BLD: 178 10^3/UL (ref 130–400)
PMV BLD AUTO: 11.4 FL (ref 7.4–10.4)
POTASSIUM SERPL-SCNC: 4 MMOL/L (ref 3.6–5)
PROT SERPL-MCNC: 5.9 GM/DL (ref 6.4–8.2)
RBC # BLD AUTO: 4.46 10^6/UL (ref 4.35–5.85)
SODIUM SERPL-SCNC: 134 MMOL/L (ref 135–145)
TOXIC GRANULES BLD QL SMEAR: (no result)
VARIANT LYMPHS NFR BLD MANUAL: 6 %
WBC # BLD AUTO: 19.1 10^3/UL (ref 4.3–11)

## 2018-04-13 RX ADMIN — KETOROLAC TROMETHAMINE PRN MG: 30 INJECTION, SOLUTION INTRAMUSCULAR; INTRAVENOUS at 08:31

## 2018-04-13 RX ADMIN — INSULIN HUMAN SCH UNIT: 100 INJECTION, SOLUTION PARENTERAL at 21:01

## 2018-04-13 RX ADMIN — KETOROLAC TROMETHAMINE PRN MG: 30 INJECTION, SOLUTION INTRAMUSCULAR; INTRAVENOUS at 04:13

## 2018-04-13 RX ADMIN — INSULIN HUMAN SCH UNIT: 100 INJECTION, SOLUTION PARENTERAL at 11:00

## 2018-04-13 RX ADMIN — ACETAMINOPHEN PRN MG: 500 TABLET ORAL at 15:51

## 2018-04-13 RX ADMIN — SODIUM CHLORIDE SCH MLS/HR: 900 INJECTION, SOLUTION INTRAVENOUS at 04:55

## 2018-04-13 RX ADMIN — KETOROLAC TROMETHAMINE PRN MG: 30 INJECTION, SOLUTION INTRAMUSCULAR; INTRAVENOUS at 15:52

## 2018-04-13 RX ADMIN — SODIUM CHLORIDE SCH MLS/HR: 900 INJECTION, SOLUTION INTRAVENOUS at 19:50

## 2018-04-13 RX ADMIN — ACETAMINOPHEN PRN MG: 500 TABLET ORAL at 23:50

## 2018-04-13 RX ADMIN — SODIUM CHLORIDE SCH MLS/HR: 900 INJECTION, SOLUTION INTRAVENOUS at 13:03

## 2018-04-13 RX ADMIN — INSULIN HUMAN SCH UNIT: 100 INJECTION, SOLUTION PARENTERAL at 06:32

## 2018-04-13 RX ADMIN — ACETAMINOPHEN PRN MG: 500 TABLET ORAL at 08:30

## 2018-04-13 RX ADMIN — INSULIN HUMAN SCH UNIT: 100 INJECTION, SOLUTION PARENTERAL at 17:11

## 2018-04-14 VITALS — DIASTOLIC BLOOD PRESSURE: 99 MMHG | SYSTOLIC BLOOD PRESSURE: 173 MMHG

## 2018-04-14 VITALS — SYSTOLIC BLOOD PRESSURE: 173 MMHG | DIASTOLIC BLOOD PRESSURE: 99 MMHG

## 2018-04-14 VITALS — DIASTOLIC BLOOD PRESSURE: 75 MMHG | SYSTOLIC BLOOD PRESSURE: 129 MMHG

## 2018-04-14 VITALS — SYSTOLIC BLOOD PRESSURE: 124 MMHG | DIASTOLIC BLOOD PRESSURE: 70 MMHG

## 2018-04-14 VITALS — DIASTOLIC BLOOD PRESSURE: 77 MMHG | SYSTOLIC BLOOD PRESSURE: 136 MMHG

## 2018-04-14 LAB
ALBUMIN SERPL-MCNC: 3.4 GM/DL (ref 3.2–4.5)
ALP SERPL-CCNC: 37 U/L (ref 40–136)
ALT SERPL-CCNC: 73 U/L (ref 0–55)
BASOPHILS # BLD AUTO: 0 10^3/UL (ref 0–0.1)
BASOPHILS NFR BLD AUTO: 0 % (ref 0–10)
BILIRUB SERPL-MCNC: 0.8 MG/DL (ref 0.1–1)
BUN/CREAT SERPL: 16
CALCIUM SERPL-MCNC: 8.2 MG/DL (ref 8.5–10.1)
CHLORIDE SERPL-SCNC: 108 MMOL/L (ref 98–107)
CO2 SERPL-SCNC: 22 MMOL/L (ref 21–32)
CREAT SERPL-MCNC: 0.75 MG/DL (ref 0.6–1.3)
EOSINOPHIL # BLD AUTO: 0 10^3/UL (ref 0–0.3)
EOSINOPHIL NFR BLD AUTO: 1 % (ref 0–10)
ERYTHROCYTE [DISTWIDTH] IN BLOOD BY AUTOMATED COUNT: 13.6 % (ref 10–14.5)
GFR SERPLBLD BASED ON 1.73 SQ M-ARVRAT: > 60 ML/MIN
GLUCOSE SERPL-MCNC: 160 MG/DL (ref 70–105)
HCT VFR BLD CALC: 37 % (ref 40–54)
HGB BLD-MCNC: 12.2 G/DL (ref 13.3–17.7)
LYMPHOCYTES # BLD AUTO: 0.8 X 10^3 (ref 1–4)
LYMPHOCYTES NFR BLD AUTO: 17 % (ref 12–44)
MANUAL DIFFERENTIAL PERFORMED BLD QL: NO
MCH RBC QN AUTO: 28 PG (ref 25–34)
MCHC RBC AUTO-ENTMCNC: 33 G/DL (ref 32–36)
MCV RBC AUTO: 84 FL (ref 80–99)
MONOCYTES # BLD AUTO: 0.5 X 10^3 (ref 0–1)
MONOCYTES NFR BLD AUTO: 9 % (ref 0–12)
NEUTROPHILS # BLD AUTO: 3.5 X 10^3 (ref 1.8–7.8)
NEUTROPHILS NFR BLD AUTO: 73 % (ref 42–75)
PLATELET # BLD: 128 10^3/UL (ref 130–400)
PMV BLD AUTO: 11.5 FL (ref 7.4–10.4)
POTASSIUM SERPL-SCNC: 3.9 MMOL/L (ref 3.6–5)
PROT SERPL-MCNC: 5.6 GM/DL (ref 6.4–8.2)
RBC # BLD AUTO: 4.4 10^6/UL (ref 4.35–5.85)
SODIUM SERPL-SCNC: 137 MMOL/L (ref 135–145)
WBC # BLD AUTO: 4.8 10^3/UL (ref 4.3–11)

## 2018-04-14 RX ADMIN — INSULIN HUMAN SCH UNIT: 100 INJECTION, SOLUTION PARENTERAL at 05:24

## 2018-04-14 RX ADMIN — SODIUM CHLORIDE SCH MLS/HR: 900 INJECTION, SOLUTION INTRAVENOUS at 09:21

## 2018-04-14 RX ADMIN — INSULIN HUMAN SCH UNIT: 100 INJECTION, SOLUTION PARENTERAL at 11:15

## 2018-04-14 RX ADMIN — SODIUM CHLORIDE SCH MLS/HR: 900 INJECTION, SOLUTION INTRAVENOUS at 02:26

## 2018-04-14 RX ADMIN — KETOROLAC TROMETHAMINE PRN MG: 30 INJECTION, SOLUTION INTRAMUSCULAR; INTRAVENOUS at 00:29

## 2018-04-14 NOTE — DISCHARGE SUMMARY
Diagnosis/Chief Complaint


Date of Admission


2018 at 10:43 pm


Date of Discharge


2018


Admission Diagnosis


Admission Diagnosis


Sepsis from UTI


Bacteremia


HTN


NIDDM II





Discharge Diagnosis


See Above





Chief Complaint/HPI


Chief Complaint/HPI


See H&P





Discharge Summary-Simple/Stand


Consultations





Discharge Physical Examination


Allergies:  


Coded Allergies:  


     No Known Drug Allergies (Unverified , 18)


Vitals & I&Os





Vital Sign - Last 12Hours








  Date Time  Temp Pulse Resp B/P (MAP) Pulse Ox O2 Delivery O2 Flow Rate FiO2


 


18 12:00 98.9 85 22 173/99 (123)  Room Air  


 


18 00:07     96   














Intake and Output 


 


 18





 00:00


 


Intake Total 3460 ml


 


Output Total 1225 ml


 


Balance 2235 ml








General Appearance:  Alert, Oriented X3, Cooperative, No Acute Distress


HEENT:  Mucous Memb Moist/Arma


Respiratory:  Clear to Auscultation, Normal Air Movement


Cardiovascular:  Regular Rate, No Murmurs


Abdominal:  Normal Bowel Sounds, Soft, No Tenderness, No Hepatosplenomegaly, No 

Masses


Extremities:  No Tenderness/Swelling, Other (trace edema bilaterally)


Skin:  No Rashes, No Breakdown


Neuro:  Normal Speech, Strength at 5/5 X4 Ext, Sensation Intact, Cranial Nerves 

3-12 NL


Psych/Mental Status:  Mental Status NL, Mood NL





Hospital Course


See final discharge diagnosis.





Discussion & Recommendations


31 yo M that presented with fevers and found to have UTI and met Sepsis criteria





Sepsis with UTI: Cultures grew out Group B strep from urine and blood. Patient'

s fever trended down and resolved by time of discharge. Discharged on Cefdinir 

for 10 days. VS remained stable throughout admission. 





HTN: Controlled on home meds during admission





NIDDM: A1c pending, Discharged on Home medications, no adjustments made





Will have close follow up with Rashmi Harris at [facility]





Discharge


Condition at discharge


stable


Instructions to patient/family


Please see electronic discharge instructions given to patient.


Discharge Medications


Reviewed and agree with Discharge Medication list on patient's Discharge 

Instruction sheet





Clinical Quality Measures


DVT/VTE Risk/Contraindication:


Risk Factor Score Per Nursin


RFS Level Per Nursing on Admit:  2=Moderate





Copy


Copies To 1:   [facility], CJ Lopez MD 2018 13:07

## 2018-04-14 NOTE — HISTORY & PHYSICIAL ([facility])
HPI


History of Present Illness:


31 yo M that presented to walkin clinic and was found that have a temperature 

of 103 with tachycardia. Patient has vague complaints and was sent to ER for 

labs and workup. Patient states that he started feeling bad yesterday morning 

and started spiking fevers this morning prior to presenting to walk in clinic. 

States that he has noticed that his urine is darker and he has had increased 

frequency. Denies any URI complaints, no pain, or shortness of breath. Patient 

has h/o ureteral strictures 2 years ago that required laser treatment and 

states that he gets frequent UTIs. Last UTI was 6 months ago. Patient has 

uncontrolled DM and controlled HTN. No other concerns this AM.





Upon arrival to ER patient met sepsis criteria and was started on IVFs per 

protocol. He was found to have temperature of 102 and tachycardic. Blood and 

urine cultures obtained and antibiotics started.


Source:  patient, RN/MD


Exam Limitations:  no limitations


Date seen by provider:  2018


Time Seen by Provider:  07:15


Attending Physician


Jennifer Arvizu MD


[facility]


[facility] [place],[facility], [facility]


Consult





Date of Admission


2018 at 10:43 pm





Home Medications


Home Medications


Reviewed patient Home Medication Reconciliation performed by pharmacy 

medication reconciliations technician and/or nursing.


Patients Allergies have been reviewed.





Allergies


Coded Allergies:  


     No Known Drug Allergies (Unverified , 18)





PMH-Social-Family Hx


Patient Social History


Living Status:  Lives at home alone


Alcohol Use:  Regular Use


Recreational Drug Use:  Yes (THC)


Drug of Choice:  THC


Smoking Status:  Former Smoker


Type Used:  Cigarettes


Recent Foreign Travel:  No


Contact w/other who traveled:  No


Recent Infectious Disease Expo:  No


Physical Abuse Screen:  No


Sexual Abuse:  No





Past Medical History





NIDDM


HTN





Family Medical History


Family History:  


Diabetes mellitus


  19 MOTHER


FHx: heart failure


  19 FATHER


FHx: stroke


  MATERNAL GRANDMOTHER


Hypertension


  19 FATHER


  19 MOTHER





Review of Systems ([facility])


Constitutional:  chills, fever, malaise


EENTM:  no symptoms reported; No ear pain, No nose congestion, No throat pain


Respiratory:  no symptoms reported; No cough, No dyspnea on exertion, No short 

of breath


Cardiovascular:  no symptoms reported; No chest pain, No edema, No palpitations


Gastrointestinal:  abdominal pain (lower abdominal pain); No constipation, No 

diarrhea, No nausea, No vomiting


Genitourinary:  No dysuria; frequency; No hematuria


Musculoskeletal:  no symptoms reported; No back pain, No joint pain, No muscle 

pain


Skin:  no symptoms reported; No lesions, No rash


Psychiatric/Neurological:  No Symptoms Reported





Reviewed Test Results


Reviewed Test Results


Lab





Laboratory Tests








Test


 18


20:28 18


20:34 18


20:45 18


22:50 Range/Units


 


 


White Blood Count


 10.5 


 


 


 


 4.3-11.0


10^3/uL


 


Red Blood Count


 5.19 


 


 


 


 4.35-5.85


10^6/uL


 


Hemoglobin 14.7     13.3-17.7  G/DL


 


Hematocrit 43     40-54  %


 


Mean Corpuscular Volume 82     80-99  FL


 


Mean Corpuscular Hemoglobin 28     25-34  PG


 


Mean Corpuscular Hemoglobin


Concent 34 


 


 


 


 32-36  G/DL





 


Red Cell Distribution Width 13.3     10.0-14.5  %


 


Platelet Count


 180 


 


 


 


 130-400


10^3/uL


 


Mean Platelet Volume 11.1 H    7.4-10.4  FL


 


Neutrophils (%) (Auto) 90 H    42-75  %


 


Lymphocytes (%) (Auto) 8 L    12-44  %


 


Monocytes (%) (Auto) 1     0-12  %


 


Eosinophils (%) (Auto) 1     0-10  %


 


Basophils (%) (Auto) 0     0-10  %


 


Neutrophils # (Auto) 9.4 H    1.8-7.8  X 10^3


 


Lymphocytes # (Auto) 0.9 L    1.0-4.0  X 10^3


 


Monocytes # (Auto) 0.1     0.0-1.0  X 10^3


 


Eosinophils # (Auto)


 0.1 


 


 


 


 0.0-0.3


10^3/uL


 


Basophils # (Auto)


 0.0 


 


 


 


 0.0-0.1


10^3/uL


 


Neutrophils % (Manual) 76      %


 


Lymphocytes % (Manual) 13      %


 


Monocytes % (Manual) 0      %


 


Eosinophils % (Manual) 3      %


 


Basophils % (Manual) 0      %


 


Band Neutrophils 8      %


 


Blood Morphology Comment NORMAL      


 


Urine Color YELLOW      


 


Urine Clarity


 SLIGHTLY


CLOUDY 


 


 


  





 


Urine pH 6     5-9  


 


Urine Specific Gravity 1.010 L    1.016-1.022  


 


Urine Protein 2+ H    NEGATIVE  


 


Urine Glucose (UA) NEGATIVE     NEGATIVE  


 


Urine Ketones NEGATIVE     NEGATIVE  


 


Urine Nitrite NEGATIVE     NEGATIVE  


 


Urine Bilirubin NEGATIVE     NEGATIVE  


 


Urine Urobilinogen NORMAL     NORMAL  MG/DL


 


Urine Leukocyte Esterase 3+ H    NEGATIVE  


 


Urine RBC (Auto) 2+ H    NEGATIVE  


 


Urine RBC 0-2      /HPF


 


Urine WBC TNTC H     /HPF


 


Urine Crystals NONE      /LPF


 


Urine Bacteria LARGE H     /HPF


 


Urine Casts NONE      /LPF


 


Urine Mucus NEGATIVE      /LPF


 


Urine Culture Indicated YES      


 


Sodium Level 137     135-145  MMOL/L


 


Potassium Level 3.8     3.6-5.0  MMOL/L


 


Chloride Level 101       MMOL/L


 


Carbon Dioxide Level 25     21-32  MMOL/L


 


Anion Gap 11     5-14  MMOL/L


 


Blood Urea Nitrogen 14     7-18  MG/DL


 


Creatinine


 0.90 


 


 


 


 0.60-1.30


MG/DL


 


Estimat Glomerular Filtration


Rate > 60 


 


 


 


  





 


BUN/Creatinine Ratio 16      


 


Glucose Level 143 H      MG/DL


 


Calcium Level 9.5     8.5-10.1  MG/DL


 


Total Bilirubin 1.2 H    0.1-1.0  MG/DL


 


Aspartate Amino Transf


(AST/SGOT) 18 


 


 


 


 5-34  U/L





 


Alanine Aminotransferase


(ALT/SGPT) 48 


 


 


 


 0-55  U/L





 


Alkaline Phosphatase 42       U/L


 


Total Protein 7.1     6.4-8.2  GM/DL


 


Albumin 4.3     3.2-4.5  GM/DL


 


Amylase Level 51       U/L


 


Lipase 6 L    8-78  U/L


 


Urine Opiates Screen NEGATIVE     NEGATIVE  


 


Urine Oxycodone Screen NEGATIVE     NEGATIVE  


 


Urine Methadone Screen NEGATIVE     NEGATIVE  


 


Urine Propoxyphene Screen NEGATIVE     NEGATIVE  


 


Urine Barbiturates Screen NEGATIVE     NEGATIVE  


 


Ur Tricyclic Antidepressants


Screen NEGATIVE 


 


 


 


 NEGATIVE  





 


Urine Phencyclidine Screen NEGATIVE     NEGATIVE  


 


Urine Amphetamines Screen NEGATIVE     NEGATIVE  


 


Urine Methamphetamines Screen NEGATIVE     NEGATIVE  


 


Urine Benzodiazepines Screen NEGATIVE     NEGATIVE  


 


Urine Cocaine Screen NEGATIVE     NEGATIVE  


 


Urine Cannabinoids Screen POSITIVE H    NEGATIVE  


 


Serum Alcohol < 10     <10  MG/DL


 


Lactic Acid Level


 


 2.44 *H


 


 1.66 


 0.50-2.00


MMOL/L


 


Monoscreen   NEGATIVE   NEGATIVE  


 


Test


 18


05:25 18


05:51 18


11:27 18


16:10 Range/Units


 


 


White Blood Count


 19.1 H


 


 


 


 4.3-11.0


10^3/uL


 


Red Blood Count


 4.46 


 


 


 


 4.35-5.85


10^6/uL


 


Hemoglobin 12.7 L    13.3-17.7  G/DL


 


Hematocrit 37 L    40-54  %


 


Mean Corpuscular Volume 83     80-99  FL


 


Mean Corpuscular Hemoglobin 29     25-34  PG


 


Mean Corpuscular Hemoglobin


Concent 34 


 


 


 


 32-36  G/DL





 


Red Cell Distribution Width 13.1     10.0-14.5  %


 


Platelet Count


 178 


 


 


 


 130-400


10^3/uL


 


Mean Platelet Volume 11.4 H    7.4-10.4  FL


 


Neutrophils (%) (Auto) 93 H    42-75  %


 


Lymphocytes (%) (Auto) 2 L    12-44  %


 


Monocytes (%) (Auto) 4     0-12  %


 


Eosinophils (%) (Auto) 0     0-10  %


 


Basophils (%) (Auto) 0     0-10  %


 


Neutrophils # (Auto) 17.8 H    1.8-7.8  X 10^3


 


Lymphocytes # (Auto) 0.5 L    1.0-4.0  X 10^3


 


Monocytes # (Auto) 0.8     0.0-1.0  X 10^3


 


Eosinophils # (Auto)


 0.0 


 


 


 


 0.0-0.3


10^3/uL


 


Basophils # (Auto)


 0.0 


 


 


 


 0.0-0.1


10^3/uL


 


Neutrophils % (Manual) 76      %


 


Lymphocytes % (Manual) 6      %


 


Monocytes % (Manual) 2      %


 


Eosinophils % (Manual) 0      %


 


Basophils % (Manual) 0      %


 


Band Neutrophils 16      %


 


Toxic Granulation 1+      


 


Sodium Level 134 L    135-145  MMOL/L


 


Potassium Level 4.0     3.6-5.0  MMOL/L


 


Chloride Level 101       MMOL/L


 


Carbon Dioxide Level 21     21-32  MMOL/L


 


Anion Gap 12     5-14  MMOL/L


 


Blood Urea Nitrogen 19 H    7-18  MG/DL


 


Creatinine


 0.98 


 


 


 


 0.60-1.30


MG/DL


 


Estimat Glomerular Filtration


Rate > 60 


 


 


 


  





 


BUN/Creatinine Ratio 19      


 


Glucose Level 235 H      MG/DL


 


Calcium Level 8.3 L    8.5-10.1  MG/DL


 


Total Bilirubin 1.4 H    0.1-1.0  MG/DL


 


Aspartate Amino Transf


(AST/SGOT) 14 


 


 


 


 5-34  U/L





 


Alanine Aminotransferase


(ALT/SGPT) 40 


 


 


 


 0-55  U/L





 


Alkaline Phosphatase 29 L      U/L


 


Total Protein 5.9 L    6.4-8.2  GM/DL


 


Albumin 3.6     3.2-4.5  GM/DL


 


Glucometer  213 H 155 H 180 H   MG/DL


 


Test


 18


20:15 18


05:21 18


06:22 18


11:06 Range/Units


 


 


Glucometer 216 H 146 H  179 H   MG/DL


 


White Blood Count


 


 


 4.8 


 


 4.3-11.0


10^3/uL


 


Red Blood Count


 


 


 4.40 


 


 4.35-5.85


10^6/uL


 


Hemoglobin   12.2 L  13.3-17.7  G/DL


 


Hematocrit   37 L  40-54  %


 


Mean Corpuscular Volume   84   80-99  FL


 


Mean Corpuscular Hemoglobin   28   25-34  PG


 


Mean Corpuscular Hemoglobin


Concent 


 


 33 


 


 32-36  G/DL





 


Red Cell Distribution Width   13.6   10.0-14.5  %


 


Platelet Count


 


 


 128 L


 


 130-400


10^3/uL


 


Mean Platelet Volume   11.5 H  7.4-10.4  FL


 


Neutrophils (%) (Auto)   73   42-75  %


 


Lymphocytes (%) (Auto)   17   12-44  %


 


Monocytes (%) (Auto)   9   0-12  %


 


Eosinophils (%) (Auto)   1   0-10  %


 


Basophils (%) (Auto)   0   0-10  %


 


Neutrophils # (Auto)   3.5   1.8-7.8  X 10^3


 


Lymphocytes # (Auto)   0.8 L  1.0-4.0  X 10^3


 


Monocytes # (Auto)   0.5   0.0-1.0  X 10^3


 


Eosinophils # (Auto)


 


 


 0.0 


 


 0.0-0.3


10^3/uL


 


Basophils # (Auto)


 


 


 0.0 


 


 0.0-0.1


10^3/uL


 


Sodium Level   137   135-145  MMOL/L


 


Potassium Level   3.9   3.6-5.0  MMOL/L


 


Chloride Level   108 H    MMOL/L


 


Carbon Dioxide Level   22   21-32  MMOL/L


 


Anion Gap   7   5-14  MMOL/L


 


Blood Urea Nitrogen   12   7-18  MG/DL


 


Creatinine


 


 


 0.75 


 


 0.60-1.30


MG/DL


 


Estimat Glomerular Filtration


Rate 


 


 > 60 


 


  





 


BUN/Creatinine Ratio   16    


 


Glucose Level   160 H    MG/DL


 


Calcium Level   8.2 L  8.5-10.1  MG/DL


 


Total Bilirubin   0.8   0.1-1.0  MG/DL


 


Aspartate Amino Transf


(AST/SGOT) 


 


 47 H


 


 5-34  U/L





 


Alanine Aminotransferase


(ALT/SGPT) 


 


 73 H


 


 0-55  U/L





 


Alkaline Phosphatase   37 L    U/L


 


Total Protein   5.6 L  6.4-8.2  GM/DL


 


Albumin   3.4   3.2-4.5  GM/DL








Radiology


Date of Exam:   18





ACUTE ABD SERIES


 





EXAMINATION: Acute abdomen





INDICATION: Back pain and dysuria





The accompanying erect PA chest shows the heart size to be within


normal limits. The perihilar markings are prominent bilaterally,


particularly on the right. There is no evidence for a hilar mass


on the right but a followup PA and lateral chest would be


recommended for further study unless there are previous exams


available to demonstrate that this finding is stable.





The lungs are clear. There is no sign of pneumoperitoneum.





Supine and erect views of the abdomen were obtained. There is gas


in both the large and small bowel in a nonspecific fashion. There


is no evidence for bowel obstruction. Both the liver and spleen


shadows do seem prominent. The CT abdomen/pelvis exam performed


prior to the study did suggest mild hepatosplenomegaly. The


osseous structures are intact.





IMPRESSION:


1. The bowel gas pattern is nonspecific. There is no acute


abnormality identified.


2. There is mild hepatosplenomegaly.


3. The right hilum is prominent. This finding is more likely due


to superimposition of the pulmonary vessels than to a mass


lesion. Recommendations as above.








Date of Exam:   18





CT ABD/PELVIS WO(KIDNEY STONE)


 





PROCEDURE: CT urinary tract, rule out kidney stone.





TECHNIQUE: Multiple contiguous axial images were obtained through


the abdomen and pelvis without the use of intravenous contrast.





INDICATION: Back pain, dysuria.





COMPARISON: There are no prior studies available for comparison.





FINDINGS:  


There is no evidence for nephrolithiasis or urolithiasis and the


kidneys do not seem to be obstructed.





The urinary bladder is not well-distended and there does seem to


be generalized thickening of the wall of the bladder. This is


probably related to the incomplete distention of the bladder.


However, the possibility that there is an element of cystitis


present should still be considered. Correlation with the


patient's urinalysis would be recommended. 





The prostate gland is not enlarged. 





There is no pelvic mass or free fluid collection noted.





The appendix was visualized and is not abnormally thickened.





Both the liver and spleen are mildly enlarged. The liver measures


approximately 18.5 cm in length while the spleen is estimated at


13.6 cm. The reason for the hepatosplenomegaly is not certain.





The stomach is partially filled with fluid and gas and difficult


to assess.





The gallbladder, adrenals, pancreas, aorta and inferior vena cava


show no sign of an acute abnormality. 





The lung bases are clear.





The bone windows show no sign of a fracture or of a destructive


lesion. 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


however.





IMPRESSION:


1. There is no acute abnormality of the abdomen or pelvis.


2. The thickened appearance of the wall of the bladder may be


secondary to incomplete distention. The possibility that there is


an element of cystitis present should also be considered.


Correlation with patient's urinalysis would be recommended. 


3. There is mild hepatosplenomegaly. This is of uncertain


etiology.





Physical Exam-([facility])


Physical Exam


Vital Signs





 VS - Last 72 Hours, by Label








 18





 20:29 22:51 23:00 23:00


 


Temp 103.2 102.6  100.3


 


Pulse 125 106  108


 


Resp 24 16  20


 


B/P (MAP) 138/71 (93) 109/54  130/65 (86)


 


Pulse Ox 96 93  95


 


O2 Delivery   Room Air Room Air


 


    





 18





 01:00 03:51 07:00 08:00


 


Temp  97.9  99.0


 


Pulse 108 115 103 105


 


Resp  18  24


 


B/P (MAP)  110/56 (74)  143/57 (85)


 


Pulse Ox  94  100


 


O2 Delivery  Room Air  Room Air


 


    





 18





 12:00 13:00 15:50 19:00


 


Temp 99.3  103.0 


 


Pulse 107 102 119 101


 


Resp 20  18 


 


B/P (MAP) 132/69 (90)  127/58 (81) 


 


Pulse Ox 96  95 


 


O2 Delivery Room Air  Room Air 


 


    





 18





 19:45 23:50 00:07 00:29


 


Temp 99.3 100.8 100.8 101.0


 


Pulse 96  110 


 


Resp 18  18 


 


B/P (MAP) 108/58 (75)  124/70 (88) 


 


Pulse Ox 96  96 


 


O2 Delivery Room Air  Room Air 


 


    





 18





 00:30 01:00 01:10 01:11


 


Temp 101.0  98.5 98.5


 


Pulse  107  


 


    





 18





 04:00 07:00 08:00 12:00


 


Temp 97.1  97.0 98.9


 


Pulse 90 88 87 85


 


Resp 18  16 22


 


B/P (MAP) 136/77 (96)  129/75 (93) 173/99 (123)


 


O2 Delivery Room Air  Room Air Room Air


 


    





 18   





 14:00   


 


Pulse 85   


 


Resp 22   


 


B/P (MAP) 173/99   


 


Pulse Ox 96   


 


O2 Delivery Room Air   





Capillary Refill : Less Than 3 Seconds


General Appearance:  WD/WN, no apparent distress


HEENT:  PERRL/EOMI


Neck:  non-tender, full range of motion, supple


Respiratory:  chest non-tender, lungs clear, normal breath sounds, no 

respiratory distress, no accessory muscle use


Cardiovascular:  normal peripheral pulses, regular rate, rhythm, no edema, no 

murmur


Gastrointestinal:  normal bowel sounds, non tender, soft, no organomegaly; No 

guarding, No rebound


Back:  no CVA tenderness


Extremities:  normal range of motion, no pedal edema, no calf tenderness, 

normal capillary refill


Neurologic/Psychiatric:  CNs II-XII nml as tested, no motor/sensory deficits, 

alert, normal mood/affect, oriented x 3


Skin:  normal color, warm/dry


Lymphatic:  no adenopathy





Assessment/Plan


Assessment/Plan


Admission Status:  Observation





(1) Sepsis due to urinary tract infection


Status:  Acute


Assessment & Plan:  Sepsis resolved in AM


Blood and urine cultures + Group B Strep


Continue Rocephin





(2) HTN (hypertension)


Status:  Chronic


Assessment & Plan:  Continue home meds


Qualifiers:  


   Qualified Codes:  I10 - Essential (primary) hypertension


(3) Non-insulin dependent type 2 diabetes mellitus


Status:  Chronic


Assessment & Plan:  A1c pending


continue home meds


Accucheck ACHS








Clinical Quality Measures


DVT/VTE Risk/Contraindication:


Risk Factor Score Per Nursin


RFS Level Per Nursing on Admit:  2=Moderate





Copy


Copies To 1:   Rashmi LONDON HOLLY R MD 2018 13:07

## 2018-04-14 NOTE — DISCHARGE INSTRUCTIONS
Discharge Inst-[facility]


Discharge Medications


New, Converted or Re-Newed RX:  Transmitted to Pharmacy


New Medications:  


Cefdinir (Cefdinir) 300 Mg Capsule


300 MG PO BID for 10 Days, #20 CAP





 


Continued Medications:  


Atorvastatin Calcium (Atorvastatin Calcium) 20 Mg Tablet


20 MG PO DAILY, TAB





Buspirone HCl (Buspirone HCl) 10 Mg Tablet


10 MG PO TID PRN for ANXIETY, TAB





Fluticasone Propionate (Flonase Allergy Relief) 9.9 Ml [email or web address].susp


2 SPRAY NS DAILY PRN for ALLERGIES, EACH





Glimepiride (Glimepiride) 1 Mg Tablet


1 MG PO HS, TAB





Lisinopril (Lisinopril) 10 Mg Tablet


10 MG PO DAILY, TAB





Metformin HCl (Metformin HCl ER) 500 Mg Tab.er.24h


1000 MG PO BID, TAB


TAKES 2 (500MG) TABLETS


Mirabegron (Myrbetriq) 25 Mg Tab.er.24h


25 MG PO DAILY, TAB





Sertraline HCl (Zoloft) 50 Mg Tablet


50 MG PO DAILY, TAB











Patient Instructions


Goal/Follow Up Appt:  


You will be called on Monday with a hospital follow up appt with Rashmi Harris


Patient Instructions:  


- make sure you complete all your antibiotics


- Push hydration


Return to The Hospital For:  


- Increasing fever slope


- Unable to tolerate antibioitics





Activity & Diet


Discharge Diet:  ADA Diet


Activity as Tolerated:  Yes





Copy


Copies To 1:   [facility] MD Apr 14, 2018 1:17 pm